# Patient Record
Sex: FEMALE | Race: WHITE | NOT HISPANIC OR LATINO | Employment: FULL TIME | URBAN - METROPOLITAN AREA
[De-identification: names, ages, dates, MRNs, and addresses within clinical notes are randomized per-mention and may not be internally consistent; named-entity substitution may affect disease eponyms.]

---

## 2021-04-09 ENCOUNTER — HOSPITAL ENCOUNTER (EMERGENCY)
Facility: HOSPITAL | Age: 21
Discharge: HOME | End: 2021-04-09
Attending: EMERGENCY MEDICINE | Admitting: EMERGENCY MEDICINE

## 2021-04-09 VITALS
HEART RATE: 116 BPM | SYSTOLIC BLOOD PRESSURE: 133 MMHG | OXYGEN SATURATION: 99 % | TEMPERATURE: 98.6 F | DIASTOLIC BLOOD PRESSURE: 82 MMHG | HEIGHT: 64 IN | BODY MASS INDEX: 17.24 KG/M2 | RESPIRATION RATE: 16 BRPM | WEIGHT: 101 LBS

## 2021-04-09 DIAGNOSIS — S92.414B OPEN NONDISPLACED FRACTURE OF PROXIMAL PHALANX OF RIGHT GREAT TOE, INITIAL ENCOUNTER: ICD-10-CM

## 2021-04-09 DIAGNOSIS — S91.211A LACERATION OF RIGHT GREAT TOE WITHOUT FOREIGN BODY WITH DAMAGE TO NAIL, INITIAL ENCOUNTER: Primary | ICD-10-CM

## 2021-04-09 PROCEDURE — 99282 EMERGENCY DEPT VISIT SF MDM: CPT

## 2021-04-09 RX ORDER — CETIRIZINE HYDROCHLORIDE 10 MG/1
10 TABLET ORAL DAILY
COMMUNITY

## 2021-04-09 RX ORDER — ACETAMINOPHEN 500 MG
500 TABLET ORAL EVERY 6 HOURS PRN
COMMUNITY

## 2021-04-09 RX ORDER — LIDOCAINE HYDROCHLORIDE 10 MG/ML
10 INJECTION, SOLUTION INFILTRATION; PERINEURAL ONCE
Status: DISCONTINUED | OUTPATIENT
Start: 2021-04-09 | End: 2021-04-09 | Stop reason: HOSPADM

## 2021-04-09 RX ORDER — DIPHENHYDRAMINE HCL 25 MG
25 CAPSULE ORAL EVERY 6 HOURS PRN
COMMUNITY

## 2021-04-09 RX ORDER — CEPHALEXIN 500 MG/1
500 CAPSULE ORAL 3 TIMES DAILY
Qty: 21 CAPSULE | Refills: 0 | Status: SHIPPED | OUTPATIENT
Start: 2021-04-09

## 2021-04-09 NOTE — ED PROVIDER NOTES
EMERGENCY DEPARTMENT ENCOUNTER    Room Number:  07/07  Date of encounter:  4/9/2021  PCP: Provider, No Known  Historian: Patient      I used full protective equipment while examining this patient.  This includes face mask, gloves and protective eyewear.  I washed my hands before entering the room and immediately upon leaving the room      HPI:  Chief Complaint: Toe injury  A complete HPI/ROS/PMH/PSH/SH/FH are unobtainable due to: Nothing    Context: Anrdew Mcknight is a 20 y.o. female who presents to the ED c/o great right toe pain.  Patient states she dropped a heavy transmission on her great right toe at work at approximately 1 AM it.  Patient was seen by [employer] at approximately 7 AM.  She was told she had a fracture of her toe.  They were concerned that she may have an underlying injury and was instructed to come to the ER for possible nail removal versus treatment.  She denies any other injuries to her right foot.  She has been ambulatory with a postop shoe.  Her tetanus shot is up-to-date.    Review of Medical Records  No previous pertinent medical records.    PAST MEDICAL HISTORY  Active Ambulatory Problems     Diagnosis Date Noted   • No Active Ambulatory Problems     Resolved Ambulatory Problems     Diagnosis Date Noted   • No Resolved Ambulatory Problems     No Additional Past Medical History         PAST SURGICAL HISTORY  Past Surgical History:   Procedure Laterality Date   • EAR TUBES           FAMILY HISTORY  History reviewed. No pertinent family history.      SOCIAL HISTORY  Social History     Socioeconomic History   • Marital status: Single     Spouse name: Not on file   • Number of children: Not on file   • Years of education: Not on file   • Highest education level: Not on file   Tobacco Use   • Smoking status: Never Smoker   • Smokeless tobacco: Current User   Substance and Sexual Activity   • Alcohol use: Not Currently   • Drug use: Never         ALLERGIES  Patient has no known  allergies.        REVIEW OF SYSTEMS  All systems reviewed and negative except for those discussed in HPI.       PHYSICAL EXAM    I have reviewed the triage vital signs and nursing notes.    ED Triage Vitals   Temp Heart Rate Resp BP SpO2   04/09/21 1124 04/09/21 1124 04/09/21 1124 04/09/21 1129 04/09/21 1124   98.6 °F (37 °C) 101 18 110/71 98 %      Temp src Heart Rate Source Patient Position BP Location FiO2 (%)   04/09/21 1124 04/09/21 1124 -- -- --   Tympanic Monitor          Physical Exam  GENERAL: Alert, oriented, not distressed  HENT: head atraumatic, no nuchal rigidity  EYES: no scleral icterus, EOMI  CV: regular rhythm, regular rate, no murmur  RESPIRATORY: normal effort, CTA  ABDOMEN: soft, nontender  MUSCULOSKELETAL: Right great toe shows partial avulsion of proximal nail.  Tenderness to distal phalanx.  Neurovascular intact distally.  Remainder of toes normal.  After digital block, nail was removed and there is a laceration to the nailbed.  NEURO: alert, moves all extremities, follows commands  SKIN: warm, dry    PROCEDURES    Laceration Repair    Date/Time: 4/9/2021 3:04 PM  Performed by: Jhonny Lew PA  Authorized by: Rommel Roper MD     Consent:     Consent obtained:  Verbal  Anesthesia (see MAR for exact dosages):     Anesthesia method:  Nerve block    Block needle gauge:  27 G    Block anesthetic:  Lidocaine 1% w/o epi    Block outcome:  Anesthesia achieved  Laceration details:     Location:  Toe    Toe location:  R big toe    Length (cm):  1.8  Repair type:     Repair type:  Intermediate  Exploration:     Wound exploration: entire depth of wound probed and visualized      Wound extent: no nerve damage noted and no tendon damage noted    Treatment:     Area cleansed with:  Hibiclens    Amount of cleaning:  Standard    Irrigation solution:  Sterile saline  Skin repair:     Repair method:  Sutures    Suture size:  6-0    Wound skin closure material used: Vicryl.    Number of sutures:   5  Approximation:     Approximation:  Close  Post-procedure details:     Dressing:  Antibiotic ointment and sterile dressing    Patient tolerance of procedure:  Tolerated well, no immediate complications  Comments:      Nail was removed with sharp dissection.  Nail tacked back down with 5-0 nylon sutures.          MEDICATIONS GIVEN IN ER    Medications   lidocaine (XYLOCAINE) 1 % injection 10 mL (has no administration in time range)         PROGRESS, DATA ANALYSIS, CONSULTS, AND MEDICAL DECISION MAKING    All labs have been independently reviewed by me.  All radiology studies have been reviewed by me and discussed with radiologist dictating the report.   EKG's independently viewed and interpreted by me.  Discussion below represents my analysis of pertinent findings related to patient's condition, differential diagnosis, treatment plan and final disposition.    I have discussed case with Dr. Roper, emergency room physician.  He has performed his own bedside examination and agrees with treatment plan.    ED Course as of Apr 09 1506 Fri Apr 09, 2021   1209 Patient presents with injury to right great toe earlier this morning.  Patient has been seen at [employer] [facility] and was told that she has a distal phalanx fracture.  She was instructed to come the ER for possible underlying injury.  Plan to do a digital block and reexamine injury.    [EE]   1506 Patient's toe injury ultimately required closure of nailbed laceration.  Nail was reapproximated with sutures.  Patient will follow back up with [facility].    [EE]      ED Course User Index  [EE] Jhonny Lew PA       AS OF 15:06 EDT VITALS:    BP - 133/82  HR - 116  TEMP - 98.6 °F (37 °C) (Tympanic)  O2 SATS - 99%        DIAGNOSIS  Final diagnoses:   Laceration of right great toe without foreign body with damage to nail, initial encounter   Open nondisplaced fracture of proximal phalanx of right great toe, initial encounter         DISPOSITION  Discharged            Jhonny Lew, PA  04/09/21 1500

## 2021-04-09 NOTE — ED TRIAGE NOTES
Pt states that she works for [employer]. While at work a transmission that was being shipped fell on her right foot. Pt was seen by [employer] and told she did have a fracture of her foot. Sent her here due to hematoma under her big toe nail that might need drained or the nail removed. Patient masked at arrival and triage staff wore all appropriate PPE during entire encounter with patient.

## 2021-04-09 NOTE — ED PROVIDER NOTES
MD ATTESTATION NOTE    The NATALIE and I have discussed this patient's history, physical exam, and treatment plan.  I have reviewed the documentation and personally had a face to face interaction with the patient. I affirm the documentation and agree with the treatment and plan.  The attached note describes my personal findings.      History  20-year-old female presents after injury to the right great toe.  She dropped a transmission injuring the toe earlier this morning.    Physical Exam  Vital Signs reviewed  Alert, Well Appearing in NAD  Respirations unlabored    Disposition  I discussed treatment and evaluation of this patient with EDGARD Lew.  X-rays taken at [facility] reveal nondisplaced fracture of the proximal phalanx.  Laceration of the great toe was repaired by EDGARD Lew.  Please see procedure note for further evaluation.  Patient will be treated with p.o. Keflex to prevent infection.     Rommel Roper MD  04/09/21 5110